# Patient Record
Sex: MALE | Race: WHITE
[De-identification: names, ages, dates, MRNs, and addresses within clinical notes are randomized per-mention and may not be internally consistent; named-entity substitution may affect disease eponyms.]

---

## 2022-08-30 ENCOUNTER — HOSPITAL ENCOUNTER (EMERGENCY)
Dept: HOSPITAL 33 - ED | Age: 27
LOS: 1 days | Discharge: HOME | End: 2022-08-31
Payer: MEDICAID

## 2022-08-30 VITALS — OXYGEN SATURATION: 99 %

## 2022-08-30 VITALS — SYSTOLIC BLOOD PRESSURE: 128 MMHG | DIASTOLIC BLOOD PRESSURE: 90 MMHG | HEART RATE: 64 BPM

## 2022-08-30 DIAGNOSIS — Z79.899: ICD-10-CM

## 2022-08-30 DIAGNOSIS — R10.13: Primary | ICD-10-CM

## 2022-08-30 DIAGNOSIS — Z28.310: ICD-10-CM

## 2022-08-30 LAB
ALBUMIN SERPL-MCNC: 4.6 G/DL (ref 3.5–5)
ALP SERPL-CCNC: 102 U/L (ref 38–126)
ALT SERPL-CCNC: 54 U/L (ref 0–50)
AMYLASE SERPL-CCNC: 79 U/L (ref 30–110)
ANION GAP SERPL CALC-SCNC: 12.9 MEQ/L (ref 5–15)
AST SERPL QL: 50 U/L (ref 17–59)
BASOPHILS # BLD AUTO: 0.05 X10^3/UL (ref 0–0.4)
BILIRUB BLD-MCNC: 0.2 MG/DL (ref 0.2–1.3)
BUN SERPL-MCNC: 12 MG/DL (ref 9–20)
CALCIUM SPEC-MCNC: 9.1 MG/DL (ref 8.4–10.2)
CHLORIDE SERPL-SCNC: 107 MMOL/L (ref 98–107)
CO2 SERPL-SCNC: 27 MMOL/L (ref 22–30)
CREAT SERPL-MCNC: 1.16 MG/DL (ref 0.66–1.25)
EOSINOPHIL # BLD AUTO: 0.16 X10^3/UL (ref 0–0.5)
GFR SERPLBLD BASED ON 1.73 SQ M-ARVRAT: > 60 ML/MIN
GLUCOSE SERPL-MCNC: 107 MG/DL (ref 74–106)
HCT VFR BLD AUTO: 39.9 % (ref 42–50)
HGB BLD-MCNC: 13 G/DL (ref 12.5–18)
LIPASE SERPL-CCNC: 143 U/L (ref 23–300)
LYMPHOCYTES # SPEC AUTO: 1.17 X10^3/UL (ref 1–4.6)
MCH RBC QN AUTO: 30.4 PG (ref 26–32)
MCHC RBC AUTO-ENTMCNC: 32.6 G/DL (ref 32–36)
MONOCYTES # BLD AUTO: 0.66 X10^3/UL (ref 0–1.3)
PLATELET # BLD AUTO: 249 X10^3/UL (ref 150–450)
POTASSIUM SERPLBLD-SCNC: 4 MMOL/L (ref 3.5–5.1)
PROT SERPL-MCNC: 8.2 G/DL (ref 6.3–8.2)
RBC # BLD AUTO: 4.28 X10^6/UL (ref 4.1–5.6)
SODIUM SERPL-SCNC: 143 MMOL/L (ref 137–145)
WBC # BLD AUTO: 6 X10^3/UL (ref 4–10.5)

## 2022-08-30 PROCEDURE — 96374 THER/PROPH/DIAG INJ IV PUSH: CPT

## 2022-08-30 PROCEDURE — 36415 COLL VENOUS BLD VENIPUNCTURE: CPT

## 2022-08-30 PROCEDURE — 82150 ASSAY OF AMYLASE: CPT

## 2022-08-30 PROCEDURE — 83605 ASSAY OF LACTIC ACID: CPT

## 2022-08-30 PROCEDURE — 85025 COMPLETE CBC W/AUTO DIFF WBC: CPT

## 2022-08-30 PROCEDURE — 99283 EMERGENCY DEPT VISIT LOW MDM: CPT

## 2022-08-30 PROCEDURE — 80053 COMPREHEN METABOLIC PANEL: CPT

## 2022-08-30 PROCEDURE — 83690 ASSAY OF LIPASE: CPT

## 2022-08-30 PROCEDURE — 74176 CT ABD & PELVIS W/O CONTRAST: CPT

## 2022-08-30 RX ADMIN — KETOROLAC TROMETHAMINE ONE MG: 30 INJECTION, SOLUTION INTRAMUSCULAR; INTRAVENOUS at 23:41

## 2022-08-30 NOTE — ERPHSYRPT
- History of Present Illness


Historian: patient


Exam Limitations: no limitations


Patient Subjective Stated Complaint: Generalized sharp abdominal pain.


Triage Nursing Assessment: Pt ambulated to room. A&O X 3. Skin color WNL for 

race. Pt c/o generalized sharp abdominal pain across all 4 quadrants. Lung 

sounds clear, bowel sounds present x 4. Abdomen soft and non-distended. Denies 

N/V. Does report some diarrhea 8/28/22 but none today.


Physician History: 





27 yo wm w epigastric pain x 2 days. Pain is 8/10 and stabbing. Nothing makes it

better or worse. He denies 

N/V/D/fever/melena/hematochezia/dysuria/hematuria/cough/coryza.


Timing/Duration: day(s) (2 days)


Quality: sharpness, stabbing


Abdominal Pain Onset Location: epigastric


Pain Radiation: no radiation


Severity of Pain-Max: severe


Severity of Pain-Current: severe


Modifying Factors: Improves With: nothing


Associated Symptoms: No back, No chest pain, No diaphoresis, No diarrhea, No fev

er/chills, No fatigue, No headache, No heartburn, No loss of appetite, No 

nausea, No neck pain, No rash, No shortness of breath, No syncope, No testicular

pain, No vomiting, No weakness


Previous symptoms: no prior history


Allergies/Adverse Reactions: 








No Known Drug Allergies Allergy (Unverified 08/30/22 22:06)


   





Home Medications: 








Clobazam [Onfi] 20 mg PO QAM 08/30/22 [History]


Clobazam [Onfi] 40 mg PO QHS 08/30/22 [History]


Escitalopram Oxalate 5 mg PO QAM 08/30/22 [History]


LORazepam [Lorazepam] 1 mg PO TID 08/30/22 [History]


Lamotrigine 100 mg*** [lamICTAL 100MG TABLET***] 300 mg PO QHS 08/30/22 

[History]


Topiramate 100 mg*** [Topamax 100 MG***] 100 mg PO QAM 08/30/22 [History]


Topiramate 100 mg*** [Topamax 100 MG***] 200 mg PO QHS 08/30/22 [History]


Trazodone HCl 100 mg PO QHS 08/30/22 [History]


lamoTRIgine [Lamotrigine] 150 mg PO QAM 08/30/22 [History]








Travel Risk





- International Travel


Have you traveled outside of the country in past 3 weeks: No





- Coronavirus Screening


Are you exhibiting any of the following symptoms?: No


Close contact with a COVID-19 positive Pt in past 14-21 Days: No





- Vaccine Status


Have you recieved a Covid-19 vaccination: No





- Review of Systems


Constitutional: No Symptoms


Eyes: No Symptoms


Ears, Nose, & Throat: No Symptoms


Respiratory: No Symptoms


Cardiac: No Symptoms


Abdominal/Gastrointestinal: No Symptoms, Abdominal Pain


Genitourinary Symptoms: No Symptoms


Musculoskeletal: No Symptoms


Skin: No Symptoms


Neurological: No Symptoms


Psychological: No Symptoms


Endocrine: No Symptoms


Hematologic/Lymphatic: No Symptoms


Immunological/Allergic: No Symptoms





- Past Medical History


Pertinent Past Medical History: Yes


Neurological History: Epilepsy, Migraines, Seizures


ENT History: No Pertinent History


Cardiac History: No Pertinent History


Respiratory History: No Pertinent History


Endocrine Medical History: No Pertinent History


Musculoskeletal History: No Pertinent History


GI Medical History: No Pertinent History


 History: No Pertinent History


Psycho-Social History: No Pertinent History


Male Reproductive Disorders: No Pertinent History





- Past Surgical History


Past Surgical History: Yes


Neuro Surgical History: Other


Cardiac: No Pertinent History


Respiratory: No Pertinent History


Gastrointestinal: No Pertinent History


Genitourinary: No Pertinent History


Musculoskeletal: No Pertinent History


Male Surgical History: No Pertinent History


Other Surgical History: vagal nerve stimulator





- Social History


Smoking Status: Never smoker


Drug Use: none


Patient Lives Alone: Yes


Significant Family History: no pertinent family hx





- Nursing Vital Signs


Nursing Vital Signs: 


                               Initial Vital Signs











Temperature  97.4 F   08/30/22 22:16


 


Pulse Rate  78   08/30/22 22:16


 


Respiratory Rate  16   08/30/22 22:16


 


Blood Pressure  130/92   08/30/22 22:16


 


O2 Sat by Pulse Oximetry  99   08/30/22 22:16








                                   Pain Scale











Pain Intensity                 6











WNL





- Physical Exam


General Appearance: no apparent distress


Eye Exam: PERRL/EOMI, eyes nml inspection


Ears, Nose, Throat Exam: normal ENT inspection, TMs normal, pharynx normal, 

moist mucous membranes


Neck Exam: normal inspection, non-tender, supple, full range of motion, No 

meningismus, No mass, No Brudzinski, No Kernig's


Respiratory Exam: normal breath sounds, lungs clear, airway intact


Cardiovascular Exam: regular rate/rhythm, normal heart sounds, normal peripheral

 pulses, capillary refill <2 sec, No murmur


Gastrointestinal/Abdomen Exam: soft, normal bowel sounds, tenderness (Mild 

epigastric TTP wo guarding or rebound)


Back Exam: normal inspection, normal range of motion, No CVA tenderness, No 

vertebral tenderness


Extremity Exam: normal inspection, normal range of motion


Neurologic Exam: alert, oriented x 3, cooperative, CNs II-XII nml as tested, 

normal mood/affect, nml cerebellar function, nml station & gait, sensation nml


Skin Exam: normal color, warm, dry, No rash


Lymphatic Exam: No adenopathy


**SpO2 Interpretation**: normal


SpO2: 99


O2 Delivery: Room Air





- Course


Nursing assessment & vital signs reviewed: Yes





- CT Exams


  ** Abdomen/Pelvis


CT Interpretation: Tele-radiologist Report (NAD)


Ordered Tests: 


                               Active Orders 24 hr











 Category Date Time Status


 


 ABDOMEN AND PELVIS W/0 CONTRAS [CT] Stat Exams  08/30/22 23:37 Taken


 


 AMYLASE Stat Lab  08/30/22 23:04 Completed


 


 CBC W DIFF Stat Lab  08/30/22 23:04 Completed


 


 CMP Stat Lab  08/30/22 23:04 Completed


 


 LIPASE Stat Lab  08/30/22 23:04 Completed


 


 Lactic Acid Stat Lab  08/30/22 23:04 Completed








Medication Summary














Discontinued Medications














Generic Name Dose Route Start Last Admin





  Trade Name David  PRN Reason Stop Dose Admin


 


Ketorolac Tromethamine  15 mg  08/30/22 23:37  08/30/22 23:41





  Ketorolac Tromethamine 30 Mg/Ml Inj  IV  08/30/22 23:38  15 mg





  STAT ONE   Administration


 


Ketorolac Tromethamine  Confirm  08/30/22 23:40 





  Ketorolac Tromethamine 30 Mg/Ml Inj  Administered  08/30/22 23:41 





  Dose  





  30 mg  





  .ROUTE  





  .[employer] [facility] ONE  











Lab/Rad Data: 


                           Laboratory Result Diagrams





                                 08/30/22 23:04 





                                 08/30/22 23:04 





                               Laboratory Results











  08/31/22 08/30/22 08/30/22 Range/Units





  00:25 23:04 23:04 


 


WBC    6.0  (4.0-10.5)  x10^3/uL


 


RBC    4.28  (4.1-5.6)  x10^6/uL


 


Hgb    13.0  (12.5-18.0)  g/dL


 


Hct    39.9 L  (42-50)  %


 


MCV    93.2  ()  fL


 


MCH    30.4  (26-32)  pg


 


MCHC    32.6  (32-36)  g/dL


 


RDW    12.6  (11.5-14.0)  %


 


Plt Count    249  (150-450)  x10^3/uL


 


MPV    11.1 H  (7.5-11.0)  fL


 


Gran %    65.7  (36.0-66.0)  %


 


Immature Gran % (Auto)    0.5 H  (0.00-0.4)  %


 


Nucleat RBC Rel Count    0.0  (0.00-0.1)  %


 


Eos # (Auto)    0.16  (0-0.5)  x10^3/uL


 


Immature Gran # (Auto)    0.03  (0.00-0.03)  x10^3u/L


 


Absolute Lymphs (auto)    1.17  (1.0-4.6)  x10^3/uL


 


Absolute Monos (auto)    0.66  (0.0-1.3)  x10^3/uL


 


Absolute Nucleated RBC    0.00  (0.00-0.01)  x10^3u/L


 


Lymphocytes %    19.4 L  (24.0-44.0)  %


 


Monocytes %    10.9  (0.0-12.0)  %


 


Eosinophils %    2.7  (0.00-5.0)  %


 


Basophils %    0.8  (0.0-0.4)  %


 


Absolute Granulocytes    3.96  (1.4-6.9)  x10^3/uL


 


Basophils #    0.05  (0-0.4)  x10^3/uL


 


Sodium   143   (137-145)  mmol/L


 


Potassium   4.0   (3.5-5.1)  mmol/L


 


Chloride   107   ()  mmol/L


 


Carbon Dioxide   27   (22-30)  mmol/L


 


Anion Gap   12.9   (5-15)  MEQ/L


 


BUN   12   (9-20)  mg/dL


 


Creatinine   1.16   (0.66-1.25)  mg/dL


 


Estimated GFR   > 60.0   ML/MIN


 


Glucose   107 H   ()  mg/dL


 


Lactic Acid  0.8    (0.4-2.0)  


 


Calcium   9.1   (8.4-10.2)  mg/dL


 


Total Bilirubin   0.20   (0.2-1.3)  mg/dL


 


AST   50   (17-59)  U/L


 


ALT   54 H   (0-50)  U/L


 


Alkaline Phosphatase   102   ()  U/L


 


Serum Total Protein   8.2   (6.3-8.2)  g/dL


 


Albumin   4.6   (3.5-5.0)  g/dL


 


Amylase   79   ()  U/L


 


Lipase   143   ()  U/L














- Progress


Progress: improved


Progress Note: 





08/31/22 00:43


Pt given 15mg IV Toradol w improvement in pain


Counseled pt/family regarding: lab results, diagnosis, need for follow-up, rad 

results





- Departure


Departure Disposition: Home


Clinical Impression: 


 Abdominal pain





Condition: Stable


Critical Care Time: No


Referrals: 


DOCTOR,NO FAMILY [Primary Care Provider] - Follow up/PCP as directed


Instructions:  Severe Abdominal Pain, Adult (DC)


Additional Instructions: 


Follow up with your family MD in 1-2 days


Return to ER for increasing pain or temperature greater than 100.5

## 2022-08-31 NOTE — XRAY
Indication: Epigastric pain.



Multiple contiguous axial images obtained through the abdomen and pelvis

without contrast.



Comparison: None



Lung bases demonstrates minimal left base subsegmental atelectasis/scarring.

Heart not enlarged.



Stomach is distended with food/fluid.  Noncontrasted stomach and bowel loops

appear nonobstructed with normal appendix.  No free fluid/air.  Gallbladder

contracted without gallstones.  Nonobstructing faint bilateral

nephrocalcinosis.



Remaining liver, gallbladder, pancreas, spleen, adrenal glands, kidneys,

ureters, bladder, and aorta are unremarkable for noncontrast exam.



Osseous structures intact.  No ventral or inguinal hernias.



Impression: Nonobstructing faint bilateral nephrocalcinosis.  Remaining CT

abdomen/pelvis without contrast exam is negative.



Comment: Preliminary interpretation made by [facility].  No critical discrepancy.

## 2023-05-03 ENCOUNTER — HOSPITAL ENCOUNTER (OUTPATIENT)
Dept: HOSPITAL 33 - ED | Age: 28
Setting detail: OBSERVATION
LOS: 1 days | Discharge: HOME | End: 2023-05-04
Attending: INTERNAL MEDICINE | Admitting: INTERNAL MEDICINE
Payer: MEDICAID

## 2023-05-03 DIAGNOSIS — Z79.899: ICD-10-CM

## 2023-05-03 DIAGNOSIS — G40.309: Primary | ICD-10-CM
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
## 2023-05-03 DIAGNOSIS — S01.01XA: ICD-10-CM

## 2023-05-03 DIAGNOSIS — Z20.828: ICD-10-CM

## 2023-05-03 PROCEDURE — 80053 COMPREHEN METABOLIC PANEL: CPT

## 2023-05-03 PROCEDURE — 36415 COLL VENOUS BLD VENIPUNCTURE: CPT

## 2023-05-03 PROCEDURE — 93005 ELECTROCARDIOGRAM TRACING: CPT

## 2023-05-03 PROCEDURE — 99284 EMERGENCY DEPT VISIT MOD MDM: CPT

## 2023-05-03 PROCEDURE — 70450 CT HEAD/BRAIN W/O DYE: CPT

## 2023-05-03 PROCEDURE — 85025 COMPLETE CBC W/AUTO DIFF WBC: CPT

## 2023-05-03 PROCEDURE — 94760 N-INVAS EAR/PLS OXIMETRY 1: CPT

## 2023-05-03 PROCEDURE — G0378 HOSPITAL OBSERVATION PER HR: HCPCS

## 2023-05-03 NOTE — ERPHSYRPT
- History of Present Illness


Source: patient, family


Exam Limitations: no limitations


Patient Subjective Stated Complaint: pt has had multiple seizures today. one at 

1900 at [facility] and one at 2030 at home. pt has laceration on top of scalp, pt 

is here to get it looked at- not necesarily for seizures


Triage Nursing Assessment: pt ambulatory to bed by self, pt alert and oriented 

x3, pt has had multiple seizures today, pt on multiple meds for seizures,  at 

1930 pt had seizure at [facility] and hit top of head on [facility] shelf, ambulance 

was called but patient did not want to come to hospital at the time, pt had a 

2nd seizure at 2030 at home and busted open that same laceration on the top of 

his head, laceration is 2 cm x 0.5 cm, laceration still slightly bleeding,


Physician History: 





Patient is a 27-year-old male with past medical history of epilepsy presents to 

the ER after having multiple seizures today and a head injury.  Patient reports 

he was at [facility] and had a seizure that resulted in him having loss of 

consciousness and hitting his head on a metal shelf.  Patient reports having a 

small laceration with mild bleeding from it.  Patient reports EMS was called but

at the time patient was not reluctant to go to the ER.  Patient was taken back 

home.  Patient reports he was taking a shower and had a second seizure.  Patient

is alert and oriented x4.  Denies having any focal weaknesses.  Patient reports 

he has had multiple seizures in the past week. Patient reports he has not 

recently seen a neurologist.  Patient is taking his medications as prescribed.  

Denies having any headaches, blurry vision, loss lightheadedness, chest pain, 

shortness of breath, nausea/vomiting.


Allergies/Adverse Reactions: 








No Known Drug Allergies Allergy (Verified 05/03/23 22:44)


   





Home Medications: 








Clobazam [Onfi] 20 mg PO QAM 08/30/22 [History]


Clobazam [Onfi] 40 mg PO QHS 08/30/22 [History]


Escitalopram Oxalate 5 mg PO QAM 08/30/22 [History]


LORazepam [Lorazepam] 1 mg PO TID 08/30/22 [History]


Lamotrigine 100 mg*** [lamICTAL 100MG TABLET***] 300 mg PO QHS 08/30/22 

[History]


Topiramate 100 mg*** [Topamax 100 MG***] 100 mg PO QAM 08/30/22 [History]


Topiramate 100 mg*** [Topamax 100 MG***] 200 mg PO QHS 08/30/22 [History]


Trazodone HCl 100 mg PO QHS 08/30/22 [History]


lamoTRIgine [Lamotrigine] 150 mg PO QAM 08/30/22 [History]





Hx Tetanus, Diphtheria Vaccination/Date Given: No


Hx Influenza Vaccination/Date Given: No


Hx Pneumococcal Vaccination/Date Given: No


Immunizations Up to Date: Yes





Travel Risk





- International Travel


Have you traveled outside of the country in past 3 weeks: No





- Coronavirus Screening


Are you exhibiting any of the following symptoms?: No


Close contact with a COVID-19 positive Pt in past 14-21 Days: No





- Vaccine Status


Have you recieved a Covid-19 vaccination: No





- Review of Systems


Constitutional: No Fever, No Chills


Eyes: No Symptoms


Ears, Nose, & Throat: No Symptoms


Respiratory: No Cough, No Dyspnea


Cardiac: No Chest Pain, No Edema, No Syncope


Abdominal/Gastrointestinal: No Abdominal Pain, No Nausea, No Vomiting, No 

Diarrhea


Genitourinary Symptoms: No Dysuria


Musculoskeletal: Fall (He reports head trauma.), No Back Pain, No Neck Pain


Skin: No Rash


Neurological: Seizure, No Dizziness, No Focal Weakness, No Sensory Changes


Psychological: No Symptoms


Endocrine: No Symptoms


All Other Systems: Reviewed and Negative





- Past Medical History


Pertinent Past Medical History: Yes


Neurological History: Epilepsy, Migraines, Seizures


ENT History: No Pertinent History


Cardiac History: No Pertinent History


Respiratory History: No Pertinent History


Endocrine Medical History: No Pertinent History


Musculoskeletal History: No Pertinent History


GI Medical History: No Pertinent History


 History: No Pertinent History


Psycho-Social History: No Pertinent History


Male Reproductive Disorders: No Pertinent History





- Past Surgical History


Past Surgical History: Yes


Neuro Surgical History: Other


Cardiac: No Pertinent History


Respiratory: No Pertinent History


Gastrointestinal: No Pertinent History


Genitourinary: No Pertinent History


Musculoskeletal: No Pertinent History


Male Surgical History: No Pertinent History


Other Surgical History: vagal nerve stimulator





- Social History


Smoking Status: Never smoker


Exposure to second hand smoke: No


Drug Use: none


Patient Lives Alone: Yes


Significant Family History: no pertinent family hx





- Nursing Vital Signs


Nursing Vital Signs: 


                               Initial Vital Signs











Temperature  98.7 F   05/03/23 22:46


 


Pulse Rate  84   05/03/23 22:46


 


Respiratory Rate  18   05/03/23 22:46


 


Blood Pressure  101/67   05/03/23 22:46


 


O2 Sat by Pulse Oximetry  98   05/03/23 22:46








                                   Pain Scale











Pain Intensity                 3

















- Physical Exam


General Appearance: no apparent distress, alert


Eye Exam: PERRL/EOMI, eyes nml inspection


Ears, Nose, Throat Exam: normal ENT inspection, pharynx normal, moist mucous 

membranes


Neck Exam: normal inspection, non-tender, supple, full range of motion


Respiratory Exam: normal breath sounds, lungs clear, No respiratory distress


Cardiovascular Exam: regular rate/rhythm, normal heart sounds, normal peripheral

 pulses


Gastrointestinal/Abdomen Exam: soft, normal bowel sounds, No tenderness, No mass


Back Exam: normal inspection, normal range of motion, No CVA tenderness, No 

vertebral tenderness


Extremity Exam: normal inspection, normal range of motion, pelvis stable


Neurologic Exam: alert, oriented x 3, cooperative, CNs II-XII nml as tested 

(Normal strength and sensation of upper/lower extremities.), normal mood/affect,

 nml cerebellar function, nml station & gait, sensation nml, No motor deficits


Skin Exam: normal color, warm, dry, No rash


Lymphatic Exam: No adenopathy


**SpO2 Interpretation**: normal


SpO2: 98


O2 Delivery: Room Air


Comments: 





05/04/23 00:00


Head exam noted to have a laceration at the top of the head at about 2 cm x 1 mm

 along mild bleeding.


Ordered Tests: 


                               Active Orders 24 hr











 Category Date Time Status


 


 EKG-ER Only STAT Care  05/03/23 23:45 Active


 


 Pulse Oximetry (ED) STAT Care  05/03/23 23:45 Active


 


 Seizure Precautions -SCCHED STAT Care  05/03/23 23:45 Active


 


 HEAD WITHOUT CONTRAST [CT] Stat Exams  05/03/23 23:46 Completed











Lab/Rad Data: 


                           Laboratory Result Diagrams





                                 05/03/23 00:09 





                                 05/03/23 00:09 





                               Laboratory Results











  05/03/23 05/03/23 Range/Units





  00:09 00:09 


 


WBC   6.2  (4.0-10.5)  x10^3/uL


 


RBC   4.14  (4.1-5.6)  x10^6/uL


 


Hgb   12.6  (12.5-18.0)  g/dL


 


Hct   38.8 L  (42-50)  %


 


MCV   93.7  ()  fL


 


MCH   30.4  (26-32)  pg


 


MCHC   32.5  (32-36)  g/dL


 


RDW   13.6  (11.5-14.0)  %


 


Plt Count   195  (150-450)  x10^3/uL


 


MPV   10.8  (7.5-11.0)  fL


 


Gran %   68.7 H  (36.0-66.0)  %


 


Immature Gran % (Auto)   0.3  (0.00-0.4)  %


 


Nucleat RBC Rel Count   0.0  (0.00-0.1)  %


 


Eos # (Auto)   0.16  (0-0.5)  x10^3/uL


 


Immature Gran # (Auto)   0.02  (0.00-0.03)  x10^3u/L


 


Absolute Lymphs (auto)   1.19  (1.0-4.6)  x10^3/uL


 


Absolute Monos (auto)   0.51  (0.0-1.3)  x10^3/uL


 


Absolute Nucleated RBC   0.00  (0.00-0.01)  x10^3u/L


 


Lymphocytes %   19.3 L  (24.0-44.0)  %


 


Monocytes %   8.3  (0.0-12.0)  %


 


Eosinophils %   2.6  (0.00-5.0)  %


 


Basophils %   0.8  (0.0-0.4)  %


 


Absolute Granulocytes   4.25  (1.4-6.9)  x10^3/uL


 


Basophils #   0.05  (0-0.4)  x10^3/uL


 


Sodium  142   (137-145)  mmol/L


 


Potassium  4.1   (3.5-5.1)  mmol/L


 


Chloride  107   ()  mmol/L


 


Carbon Dioxide  23   (22-30)  mmol/L


 


Anion Gap  15.7 H   (5-15)  MEQ/L


 


BUN  15   (9-20)  mg/dL


 


Creatinine  1.45 H   (0.66-1.25)  mg/dL


 


Estimated GFR  > 60.0   ML/MIN


 


Glucose  97   ()  mg/dL


 


Calcium  8.4   (8.4-10.2)  mg/dL


 


Total Bilirubin  0.30   (0.2-1.3)  mg/dL


 


AST  26   (17-59)  U/L


 


ALT  32   (0-50)  U/L


 


Alkaline Phosphatase  102   ()  U/L


 


Serum Total Protein  7.4   (6.3-8.2)  g/dL


 


Albumin  4.0   (3.5-5.0)  g/dL














- Progress


Progress: unchanged


Progress Note: 





05/04/23 03:54


Patient reports feeling better. Pt denies having any changes in mentation. 

Patient has been A&O x3 since arrival to ED. Pt has significant hx of epilepsy 

with no recent follow up with neurology. Spoke to Dr. Camargo regarding patient 

case, accept patient for observation. 


Discussed with : Kiara


Will see patient in: hospital (observation)





- Departure


Departure Disposition: Observation


Clinical Impression: 


 Seizures, Epilepsy





Condition: Stable


Critical Care Time: No


Referrals: 


REN RIGGS MD [Primary Care Provider] - Follow up/PCP as directed

## 2023-05-04 VITALS — SYSTOLIC BLOOD PRESSURE: 105 MMHG | DIASTOLIC BLOOD PRESSURE: 57 MMHG | HEART RATE: 72 BPM | OXYGEN SATURATION: 95 %

## 2023-05-04 LAB
ALBUMIN SERPL-MCNC: 4 G/DL (ref 3.5–5)
ALP SERPL-CCNC: 102 U/L (ref 38–126)
ALT SERPL-CCNC: 32 U/L (ref 0–50)
ANION GAP SERPL CALC-SCNC: 15.7 MEQ/L (ref 5–15)
AST SERPL QL: 26 U/L (ref 17–59)
BASOPHILS # BLD AUTO: 0.05 X10^3/UL (ref 0–0.4)
BASOPHILS NFR BLD AUTO: 0.8 % (ref 0–0.4)
BILIRUB BLD-MCNC: 0.3 MG/DL (ref 0.2–1.3)
BUN SERPL-MCNC: 15 MG/DL (ref 9–20)
CALCIUM SPEC-MCNC: 8.4 MG/DL (ref 8.4–10.2)
CHLORIDE SERPL-SCNC: 107 MMOL/L (ref 98–107)
CO2 SERPL-SCNC: 23 MMOL/L (ref 22–30)
CREAT SERPL-MCNC: 1.45 MG/DL (ref 0.66–1.25)
EOSINOPHIL # BLD AUTO: 0.16 X10^3/UL (ref 0–0.5)
GFR SERPLBLD BASED ON 1.73 SQ M-ARVRAT: > 60 ML/MIN
GLUCOSE SERPL-MCNC: 97 MG/DL (ref 74–106)
HCT VFR BLD AUTO: 38.8 % (ref 42–50)
HGB BLD-MCNC: 12.6 G/DL (ref 12.5–18)
IMM GRANULOCYTES # BLD: 0.02 X10^3U/L (ref 0–0.03)
IMM GRANULOCYTES NFR BLD: 0.3 % (ref 0–0.4)
LYMPHOCYTES # SPEC AUTO: 1.19 X10^3/UL (ref 1–4.6)
MCH RBC QN AUTO: 30.4 PG (ref 26–32)
MCHC RBC AUTO-ENTMCNC: 32.5 G/DL (ref 32–36)
MONOCYTES # BLD AUTO: 0.51 X10^3/UL (ref 0–1.3)
NRBC # BLD AUTO: 0 X10^3U/L (ref 0–0.01)
NRBC BLD AUTO-RTO: 0 % (ref 0–0.1)
PLATELET # BLD AUTO: 195 X10^3/UL (ref 150–450)
POTASSIUM SERPLBLD-SCNC: 4.1 MMOL/L (ref 3.5–5.1)
PROT SERPL-MCNC: 7.4 G/DL (ref 6.3–8.2)
RBC # BLD AUTO: 4.14 X10^6/UL (ref 4.1–5.6)
SODIUM SERPL-SCNC: 142 MMOL/L (ref 137–145)
WBC # BLD AUTO: 6.2 X10^3/UL (ref 4–10.5)

## 2023-05-04 NOTE — PCM.SSS
History of Present Illness





- Chief Complaint


Chief Complaint: Seizures x2


History of Present Illness: 


 is a 27 year old male.with past medical history of epilepsy presents to

the ER after having multiple seizures today and a head injury.  Patient reports 

he was at [facility] and had a seizure that resulted in him having loss of 

consciousness and hitting his head on a metal shelf.  Patient reports having a 

small laceration with mild bleeding from it.  Patient reports EMS was called but

at the time patient was not reluctant to go to the ER.  Patient was taken back 

home.  Patient reports he was taking a shower and had a second seizure.  Patient

is alert and oriented x4.  Denies having any focal weaknesses.  Patient reports 

he has had multiple seizures in the past week. Patient reports he has not 

recently seen a neurologist.  Patient is taking his medications as prescribed.  

Denies having any headaches, blurry vision, loss lightheadedness, chest pain, 

shortness of breath, nausea/vomiting.








- Review of Systems


Constitutional: No Fever, No Chills


Eyes: No Symptoms


Ears, Nose, & Throat: No Symptoms


Respiratory: No Cough, No Short Of Breath


Cardiac: No Chest Pain, No Edema, No Syncope


Abdominal/Gastrointestinal: No Abdominal Pain, No Nausea, No Vomiting, No 

Diarrhea


Genitourinary Symptoms: No Dysuria


Musculoskeletal: No Back Pain, No Neck Pain


Skin: No Rash


Neurological: Seizure, No Dizziness, No Focal Weakness, No Sensory Changes


Psychological: No Symptoms


Endocrine: No Symptoms


Hematologic/Lymphatic: No Symptoms


Immunological/Allergic: No Symptoms





Medications & Allergies


Home Medications: 


                              Home Medication List





Clobazam [Onfi] 20 mg PO QAM 08/30/22 [History Confirmed 05/03/23]


Clobazam [Onfi] 40 mg PO QHS 08/30/22 [History Confirmed 05/03/23]


Escitalopram Oxalate 5 mg PO QAM 08/30/22 [History Confirmed 05/03/23]


LORazepam [Lorazepam] 1 mg PO TID 08/30/22 [History Confirmed 05/03/23]


Lamotrigine 100 mg*** [lamICTAL 100MG TABLET***] 300 mg PO QHS 08/30/22 [History

 Confirmed 05/03/23]


Topiramate 100 mg*** [Topamax 100 MG***] 100 mg PO QAM 08/30/22 [History C

onfirmed 05/03/23]


Topiramate 100 mg*** [Topamax 100 MG***] 200 mg PO QHS 08/30/22 [History 

Confirmed 05/03/23]


Trazodone HCl 100 mg PO QHS 08/30/22 [History Confirmed 05/03/23]


lamoTRIgine [Lamotrigine] 150 mg PO QAM 08/30/22 [History Confirmed 05/03/23]


Levetiracetam [Keppra] 750 mg PO BID 05/04/23 [History Confirmed 05/04/23]








Allergies/Adverse Reactions: 


                                    Allergies











Allergy/AdvReac Type Severity Reaction Status Date / Time


 


No Known Drug Allergies Allergy   Verified 05/03/23 22:44














- Past Medical History


Past Medical History: Yes


Neurological History: Seizures


ENT History: No Pertinent History, Cataracts


Cardiac History: No Pertinent History


Respiratory History: No Pertinent History


Endocrine Medical History: No Pertinent History


Musculoskelatal History: No Pertinent History


GI Medical History: No Pertinent History


 History: No Pertinent History


Pyscho-Social History: No Pertinent History


Male Reproductive Disorders: No Pertinent History


Comment: VVagus nerve Stimulator device implanted





- Past Surgical History


Past Surgical History: Yes


Neuro Surgical History: Other


Cardiac History: No Pertinent History


Respiratory Surgery: No Pertinent History


GI Surgical History: No Pertinent History


Genitourinary Surgical Hx: No Pertinent History


Musculskeletal Surgical Hx: No Pertinent History


Male Surgical History: No Pertinent History


Other Surgical History: vagal nerve stimulator





- Social History


Smoking Status: Never smoker


Exposure to second hand smoke: Yes


Alcohol: None


Drug Use: none


Significant Family History: no pertinent family hx





- Physical Exam


Vital Signs: 


                               Vital Signs - 24 hr











  Temp Pulse Resp BP BP Pulse Ox


 


 05/04/23 11:32  97.7 F  72  17   105/57  95


 


 05/04/23 07:20  97.8 F  69  17   94/81  92 L


 


 05/04/23 05:00  98.7 F  58 L  18   114/75  99


 


 05/04/23 04:00   69  16  96/62   97


 


 05/04/23 03:57       98


 


 05/04/23 03:00   62  17  109/73   95


 


 05/04/23 02:00   61  18  80/56   95


 


 05/04/23 01:00   70  18  98/66   93 L


 


 05/04/23 00:04       97


 


 05/04/23 00:00    20  92/65   96


 


 05/03/23 23:00    16  108/73   98


 


 05/03/23 22:46  98.7 F  84  18   101/67  98











General Appearance: no apparent distress, alert


Neurologic Exam: alert, oriented x 3, cooperative, normal mood/affect, nml 

cerebellar function, nml station & gait, sensation nml, No motor deficits


Eye Exam: PERRL/EOMI, eyes nml inspection


Ears, Nose, Throat Exam: normal ENT inspection, TMs normal, pharynx normal, 

moist mucous membranes


Neck Exam: normal inspection, non-tender, supple, full range of motion


Respiratory Exam: normal breath sounds, lungs clear, No respiratory distress


Cardiovascular Exam: regular rate/rhythm, normal heart sounds, normal peripheral

pulses


Gastrointestinal/Abdomen Exam: soft, normal bowel sounds, No tenderness, No mass


Back Exam: normal inspection, normal range of motion, No CVA tenderness, No 

vertebral tenderness


Extremity Exam: normal inspection, normal range of motion, pelvis stable


Skin Exam: normal color, warm, dry, No rash


Lymphatic Exam: No adenopathy





Results





- Labs


Lab/Micro Results: 


                            Lab Results-Last 24 Hours











  05/03/23 05/03/23 Range/Units





  00:09 00:09 


 


WBC  6.2   (4.0-10.5)  x10^3/uL


 


RBC  4.14   (4.1-5.6)  x10^6/uL


 


Hgb  12.6   (12.5-18.0)  g/dL


 


Hct  38.8 L   (42-50)  %


 


MCV  93.7   ()  fL


 


MCH  30.4   (26-32)  pg


 


MCHC  32.5   (32-36)  g/dL


 


RDW  13.6   (11.5-14.0)  %


 


Plt Count  195   (150-450)  x10^3/uL


 


MPV  10.8   (7.5-11.0)  fL


 


Gran %  68.7 H   (36.0-66.0)  %


 


Immature Gran % (Auto)  0.3   (0.00-0.4)  %


 


Nucleat RBC Rel Count  0.0   (0.00-0.1)  %


 


Eos # (Auto)  0.16   (0-0.5)  x10^3/uL


 


Immature Gran # (Auto)  0.02   (0.00-0.03)  x10^3u/L


 


Absolute Lymphs (auto)  1.19   (1.0-4.6)  x10^3/uL


 


Absolute Monos (auto)  0.51   (0.0-1.3)  x10^3/uL


 


Absolute Nucleated RBC  0.00   (0.00-0.01)  x10^3u/L


 


Lymphocytes %  19.3 L   (24.0-44.0)  %


 


Monocytes %  8.3   (0.0-12.0)  %


 


Eosinophils %  2.6   (0.00-5.0)  %


 


Basophils %  0.8   (0.0-0.4)  %


 


Absolute Granulocytes  4.25   (1.4-6.9)  x10^3/uL


 


Basophils #  0.05   (0-0.4)  x10^3/uL


 


Sodium   142  (137-145)  mmol/L


 


Potassium   4.1  (3.5-5.1)  mmol/L


 


Chloride   107  ()  mmol/L


 


Carbon Dioxide   23  (22-30)  mmol/L


 


Anion Gap   15.7 H  (5-15)  MEQ/L


 


BUN   15  (9-20)  mg/dL


 


Creatinine   1.45 H  (0.66-1.25)  mg/dL


 


Estimated GFR   > 60.0  ML/MIN


 


Glucose   97  ()  mg/dL


 


Calcium   8.4  (8.4-10.2)  mg/dL


 


Total Bilirubin   0.30  (0.2-1.3)  mg/dL


 


AST   26  (17-59)  U/L


 


ALT   32  (0-50)  U/L


 


Alkaline Phosphatase   102  ()  U/L


 


Serum Total Protein   7.4  (6.3-8.2)  g/dL


 


Albumin   4.0  (3.5-5.0)  g/dL














- Radiology Impressions


Radiology Exams & Impressions: 


                              Radiology Procedures











 Category Date Time Status


 


 HEAD WITHOUT CONTRAST [CT] Stat Exams  05/03/23 23:46 Completed














Assessment/Plan


(1) Epilepsy


Status: Acute   


Qualifiers: 


   Epilepsy type: generalized idiopathic   Intractability: not intractable   

Status epilepticus: without status epilepticus   Qualified Code(s): G40.309 - 

Generalized idiopathic epilepsy and epileptic syndromes, not intractable, 

without status epilepticus   


Assessment & Plan: 


                                 Chief Complaint





Diagnosis                        Seizures





                                    Allergies











Allergy/AdvReac Type Severity Reaction Status Date / Time


 


No Known Drug Allergies Allergy   Verified 05/03/23 22:44








                           Vital Signs (Last 24 hours)











  Temp Pulse Resp BP BP Pulse Ox


 


 05/04/23 11:32  97.7 F  72  17   105/57  95


 


 05/04/23 07:20  97.8 F  69  17   94/81  92 L


 


 05/04/23 05:00  98.7 F  58 L  18   114/75  99


 


 05/04/23 04:00   69  16  96/62   97


 


 05/04/23 03:57       98


 


 05/04/23 03:00   62  17  109/73   95


 


 05/04/23 02:00   61  18  80/56   95


 


 05/04/23 01:00   70  18  98/66   93 L


 


 05/04/23 00:04       97


 


 05/04/23 00:00    20  92/65   96


 


 05/03/23 23:00    16  108/73   98


 


 05/03/23 22:46  98.7 F  84  18   101/67  98








                                Home Medications











 Medication  Instructions  Recorded  Confirmed  Last Taken  Type


 


Levetiracetam [Keppra] 750 mg PO BID 05/04/23 05/04/23 05/03/23 History





    750 mg 








                               Current Medications














Discontinued Medications














Generic Name Dose Route Start Last Admin





  Trade Name Freq  PRN Reason Stop Dose Admin


 


Acetaminophen  650 mg  05/04/23 04:32  05/04/23 05:28





  Acetaminophen 325 Mg Tablet  PO  06/03/23 04:31  650 mg





  Q4H PRN PRN   Administration





  PAIN AND/OR FEVER  


 


Escitalopram Oxalate  5 mg  05/04/23 11:30  05/04/23 12:40





  Escitalopram Oxalate** 10 Mg Tablet  PO  06/03/23 11:29  Not Given





  QAM GILDA  


 


Ketorolac Tromethamine  30 mg  05/04/23 04:32 





  Ketorolac Tromethamine 30 Mg/Ml Inj  IV  05/09/23 04:31 





  Q6H PRN PRN  





  PAIN  


 


Lamotrigine  150 mg  05/04/23 11:30  05/04/23 11:23





  Lamotrigine 100 Mg Tab  PO  06/03/23 11:29  150 mg





  QAM GILDA   Administration


 


Lamotrigine  300 mg  05/04/23 22:00 





  Lamotrigine 100 Mg Tab  PO  06/03/23 21:59 





  QHS GILDA  


 


Levetiracetam  250 mg  05/04/23 11:30  05/04/23 11:22





  Levetiracetam 250 Mg Tablet  PO  06/03/23 11:29  250 mg





  BID GILDA   Administration


 


Levetiracetam  500 mg  05/04/23 11:30  05/04/23 11:28





  Levetiracetam 500 Mg Tablet  PO  06/03/23 11:29  500 mg





  BID GILDA   Administration


 


Lorazepam  1 mg  05/04/23 15:00  05/04/23 15:04





  Lorazepam 1 Mg Tablet  PO  06/03/23 14:59  Not Given





  TID GILDA  


 


Miscellaneous Information  1 each  05/04/23 11:30 





  Medication Intervention 1 Each Each    06/03/23 11:29 





  .RN TO CHECK GILDA  


 


Topiramate  100 mg  05/04/23 11:30  05/04/23 11:23





  Topiramate 50 Mg Tablet  PO  06/03/23 11:29  100 mg





  QAM GILDA   Administration


 


Topiramate  200 mg  05/04/23 22:00 





  Topiramate 50 Mg Tablet  PO  06/03/23 21:59 





  QHS GILDA  


 


Trazodone HCl  100 mg  05/04/23 22:00 





  Trazodone Hcl 50 Mg Tablet  PO  06/03/23 21:59 





  QHS GILDA  








                         Intake & Output (Last 24 hours)











 05/02/23 05/03/23 05/04/23 05/05/23





 11:59 11:59 11:59 11:59


 


Intake Total   60 120


 


Balance   60 120


 


Weight   88.6 kg 








                       Laboratory Results (Last 24 hours)











  05/03/23 05/03/23





  00:09 00:09


 


WBC   6.2


 


RBC   4.14


 


Hgb   12.6


 


Hct   38.8 L


 


MCV   93.7


 


MCH   30.4


 


MCHC   32.5


 


RDW   13.6


 


Plt Count   195


 


MPV   10.8


 


Gran %   68.7 H


 


Immature Gran % (Auto)   0.3


 


Nucleat RBC Rel Count   0.0


 


Eos # (Auto)   0.16


 


Immature Gran # (Auto)   0.02


 


Absolute Lymphs (auto)   1.19


 


Absolute Monos (auto)   0.51


 


Absolute Nucleated RBC   0.00


 


Lymphocytes %   19.3 L


 


Monocytes %   8.3


 


Eosinophils %   2.6


 


Basophils %   0.8


 


Absolute Granulocytes   4.25


 


Basophils #   0.05


 


Sodium  142 


 


Potassium  4.1 


 


Chloride  107 


 


Carbon Dioxide  23 


 


Anion Gap  15.7 H 


 


BUN  15 


 


Creatinine  1.45 H 


 


Estimated GFR  > 60.0 


 


Glucose  97 


 


Calcium  8.4 


 


Total Bilirubin  0.30 


 


AST  26 


 


ALT  32 


 


Alkaline Phosphatase  102 


 


Serum Total Protein  7.4 


 


Albumin  4.0 








                             Orders (Last 24 hours)











 Category Date Time Status


 


 Up With Assistance AS TOLERATED Activity  05/04/23 04:32 Completed


 


 Call Admit Doctor for Orders ON ADMISSION Care  05/04/23 04:32 Completed


 


 Code Status Order ROUTINE Care  05/04/23 04:32 Completed


 


 EKG-ER Only STAT Care  05/03/23 23:45 Completed


 


 IV Care Q6H Care  05/04/23 04:32 Completed


 


 Place in Observation ROUTINE Care  05/04/23 04:32 Completed


 


 Pulse Oximetry (ED) STAT Care  05/03/23 23:45 Completed


 


 Seizure Precautions -SCCHED STAT Care  05/03/23 23:45 Completed


 


 House Regular Diet Diet  05/04/23 Breakfast Completed


 


 Discharge Routine Discharge  05/04/23 12:04 Ordered


 


 HEAD WITHOUT CONTRAST [CT] Stat Exams  05/03/23 23:46 Completed


 


 Acetaminophen 325 mg*** [Tylenol 325 mg***] Med  05/04/23 04:32 Discontinued





 650 mg PO Q4H PRN PRN   


 


 Escitalopram Oxalate** [Lexapro**] Med  05/04/23 11:30 Discontinued





 5 mg PO QAM   


 


 KETOROLAC trometh 30 mg Inj*** [TORAdol 30 mg Injection Med  05/04/23 04:32 

Discontinued





 ***]   





 30 mg IV Q6H PRN PRN   


 


 Lamotrigine 100 mg*** [lamICTAL 100MG TABLET***] Med  05/04/23 11:30 

Discontinued





 150 mg PO QAM   


 


 Lamotrigine 100 mg*** [lamICTAL 100MG TABLET***] Med  05/04/23 22:00 

Discontinued





 300 mg PO QHS   


 


 Levetiracetam 250 MG*** [Keppra 250 MG***] Med  05/04/23 11:30 Discontinued





 250 mg PO BID   


 


 Levetiracetam [Keppra] Med  05/04/23 11:30 Discontinued





 500 mg PO BID   


 


 Lorazepam 1 mg*** [Ativan 1 MG***] Med  05/04/23 15:00 Discontinued





 1 mg PO TID   


 


 Medication Intervention Med  05/04/23 11:30 Discontinued





 1 each MC .RN TO CHECK   


 


 Topiramate Med  05/04/23 11:30 Discontinued





 100 mg PO QAM   


 


 Topiramate Med  05/04/23 22:00 Discontinued





 200 mg PO QHS   


 


 Trazodone HCl 50 mg*** [Desyrel 50 mg***] Med  05/04/23 22:00 Discontinued





 100 mg PO QHS   


 


 OT Screen per Nursing Assess ONCE OT  05/04/23 05:27 Completed


 


 PT Screen per Nursing Assess ONCE PT  05/04/23 05:27 Completed


 


 Transfer Order Routine Transfer  05/04/23 Completed


 


 Transfer Order Routine Transfer  05/04/23 Completed








                       Patient Care Notes (Last 24 hours)





05/04/23 15:44 Nursing Note by Carline Jay


PT and pt brother Ethan/ caregiver requested this nurse fax paperwork to pt [facility] 

nurse. This nurse contacted [facility] nurse Yuko Pérez at [phone] and obtained fax 

number to send documents. Explanation of care and results were disscussed with 

Yuko JURADO, with patient and patient caregiver permission. Yuko JURADO will try to arrange 

transportation to neurology appointment on May 10 at 1200, due to pt family 

stating they most likely could not keep this appointment. 





Initialized on 05/04/23 15:44 - END OF NOTE














Code(s): G40.909 - EPILEPSY, UNSP, NOT INTRACTABLE, WITHOUT STATUS EPILEPTICUS  







(2) Laceration of scalp


Status: Acute   





Hospital Summary





- Hospital Course


Hospital Course: 





                                 Chief Complaint





Diagnosis                        Seizures





                                    Allergies











Allergy/AdvReac Type Severity Reaction Status Date / Time


 


No Known Drug Allergies Allergy   Verified 05/03/23 22:44








                           Vital Signs (Last 24 hours)











  Temp Pulse Resp BP BP Pulse Ox


 


 05/04/23 11:32  97.7 F  72  17   105/57  95


 


 05/04/23 07:20  97.8 F  69  17   94/81  92 L


 


 05/04/23 05:00  98.7 F  58 L  18   114/75  99


 


 05/04/23 04:00   69  16  96/62   97


 


 05/04/23 03:57       98


 


 05/04/23 03:00   62  17  109/73   95


 


 05/04/23 02:00   61  18  80/56   95


 


 05/04/23 01:00   70  18  98/66   93 L


 


 05/04/23 00:04       97


 


 05/04/23 00:00    20  92/65   96


 


 05/03/23 23:00    16  108/73   98


 


 05/03/23 22:46  98.7 F  84  18   101/67  98








                                Home Medications











 Medication  Instructions  Recorded  Confirmed  Last Taken  Type


 


Levetiracetam [Keppra] 750 mg PO BID 05/04/23 05/04/23 05/03/23 History





    750 mg 








                               Current Medications














Discontinued Medications














Generic Name Dose Route Start Last Admin





  Trade Name Freq  PRN Reason Stop Dose Admin


 


Acetaminophen  650 mg  05/04/23 04:32  05/04/23 05:28





  Acetaminophen 325 Mg Tablet  PO  06/03/23 04:31  650 mg





  Q4H PRN PRN   Administration





  PAIN AND/OR FEVER  


 


Escitalopram Oxalate  5 mg  05/04/23 11:30  05/04/23 12:40





  Escitalopram Oxalate** 10 Mg Tablet  PO  06/03/23 11:29  Not Given





  QAM GILDA  


 


Ketorolac Tromethamine  30 mg  05/04/23 04:32 





  Ketorolac Tromethamine 30 Mg/Ml Inj  IV  05/09/23 04:31 





  Q6H PRN PRN  





  PAIN  


 


Lamotrigine  150 mg  05/04/23 11:30  05/04/23 11:23





  Lamotrigine 100 Mg Tab  PO  06/03/23 11:29  150 mg





  QAM GILDA   Administration


 


Lamotrigine  300 mg  05/04/23 22:00 





  Lamotrigine 100 Mg Tab  PO  06/03/23 21:59 





  QHS GILDA  


 


Levetiracetam  250 mg  05/04/23 11:30  05/04/23 11:22





  Levetiracetam 250 Mg Tablet  PO  06/03/23 11:29  250 mg





  BID GILDA   Administration


 


Levetiracetam  500 mg  05/04/23 11:30  05/04/23 11:28





  Levetiracetam 500 Mg Tablet  PO  06/03/23 11:29  500 mg





  BID GILDA   Administration


 


Lorazepam  1 mg  05/04/23 15:00  05/04/23 15:04





  Lorazepam 1 Mg Tablet  PO  06/03/23 14:59  Not Given





  TID GILDA  


 


Miscellaneous Information  1 each  05/04/23 11:30 





  Medication Intervention 1 Each Each    06/03/23 11:29 





  .RN TO CHECK GILDA  


 


Topiramate  100 mg  05/04/23 11:30  05/04/23 11:23





  Topiramate 50 Mg Tablet  PO  06/03/23 11:29  100 mg





  QAM GILDA   Administration


 


Topiramate  200 mg  05/04/23 22:00 





  Topiramate 50 Mg Tablet  PO  06/03/23 21:59 





  QHS GILDA  


 


Trazodone HCl  100 mg  05/04/23 22:00 





  Trazodone Hcl 50 Mg Tablet  PO  06/03/23 21:59 





  [facility]  








                         Intake & Output (Last 24 hours)











 05/02/23 05/03/23 05/04/23 05/05/23





 11:59 11:59 11:59 11:59


 


Intake Total   60 120


 


Balance   60 120


 


Weight   88.6 kg 








                       Laboratory Results (Last 24 hours)











  05/03/23 05/03/23





  00:09 00:09


 


WBC   6.2


 


RBC   4.14


 


Hgb   12.6


 


Hct   38.8 L


 


MCV   93.7


 


MCH   30.4


 


MCHC   32.5


 


RDW   13.6


 


Plt Count   195


 


MPV   10.8


 


Gran %   68.7 H


 


Immature Gran % (Auto)   0.3


 


Nucleat RBC Rel Count   0.0


 


Eos # (Auto)   0.16


 


Immature Gran # (Auto)   0.02


 


Absolute Lymphs (auto)   1.19


 


Absolute Monos (auto)   0.51


 


Absolute Nucleated RBC   0.00


 


Lymphocytes %   19.3 L


 


Monocytes %   8.3


 


Eosinophils %   2.6


 


Basophils %   0.8


 


Absolute Granulocytes   4.25


 


Basophils #   0.05


 


Sodium  142 


 


Potassium  4.1 


 


Chloride  107 


 


Carbon Dioxide  23 


 


Anion Gap  15.7 H 


 


BUN  15 


 


Creatinine  1.45 H 


 


Estimated GFR  > 60.0 


 


Glucose  97 


 


Calcium  8.4 


 


Total Bilirubin  0.30 


 


AST  26 


 


ALT  32 


 


Alkaline Phosphatase  102 


 


Serum Total Protein  7.4 


 


Albumin  4.0 








                             Orders (Last 24 hours)











 Category Date Time Status


 


 Up With Assistance AS TOLERATED Activity  05/04/23 04:32 Completed


 


 Call Admit Doctor for Orders ON ADMISSION Care  05/04/23 04:32 Completed


 


 Code Status Order ROUTINE Care  05/04/23 04:32 Completed


 


 EKG-ER Only STAT Care  05/03/23 23:45 Completed


 


 IV Care Q6H Care  05/04/23 04:32 Completed


 


 Place in Observation ROUTINE Care  05/04/23 04:32 Completed


 


 Pulse Oximetry (ED) STAT Care  05/03/23 23:45 Completed


 


 Seizure Precautions -SCCHED STAT Care  05/03/23 23:45 Completed


 


 House Regular Diet Diet  05/04/23 Breakfast Completed


 


 Discharge Routine Discharge  05/04/23 12:04 Ordered


 


 HEAD WITHOUT CONTRAST [CT] Stat Exams  05/03/23 23:46 Completed


 


 Acetaminophen 325 mg*** [Tylenol 325 mg***] Med  05/04/23 04:32 Discontinued





 650 mg PO Q4H PRN PRN   


 


 Escitalopram Oxalate** [Lexapro**] Med  05/04/23 11:30 Discontinued





 5 mg PO QAM   


 


 KETOROLAC trometh 30 mg Inj*** [TORAdol 30 mg Injection Med  05/04/23 04:32 

Discontinued





 ***]   





 30 mg IV Q6H PRN PRN   


 


 Lamotrigine 100 mg*** [lamICTAL 100MG TABLET***] Med  05/04/23 11:30 

Discontinued





 150 mg PO QAM   


 


 Lamotrigine 100 mg*** [lamICTAL 100MG TABLET***] Med  05/04/23 22:00 

Discontinued





 300 mg PO QHS   


 


 Levetiracetam 250 MG*** [Keppra 250 MG***] Med  05/04/23 11:30 Discontinued





 250 mg PO BID   


 


 Levetiracetam [Keppra] Med  05/04/23 11:30 Discontinued





 500 mg PO BID   


 


 Lorazepam 1 mg*** [Ativan 1 MG***] Med  05/04/23 15:00 Discontinued





 1 mg PO TID   


 


 Medication Intervention Med  05/04/23 11:30 Discontinued





 1 each MC .RN TO CHECK   


 


 Topiramate Med  05/04/23 11:30 Discontinued





 100 mg PO QAM   


 


 Topiramate Med  05/04/23 22:00 Discontinued





 200 mg PO QHS   


 


 Trazodone HCl 50 mg*** [Desyrel 50 mg***] Med  05/04/23 22:00 Discontinued





 100 mg PO QHS   


 


 OT Screen per Nursing Assess ONCE OT  05/04/23 05:27 Completed


 


 PT Screen per Nursing Assess ONCE PT  05/04/23 05:27 Completed


 


 Transfer Order Routine Transfer  05/04/23 Completed


 


 Transfer Order Routine Transfer  05/04/23 Completed








                       Patient Care Notes (Last 24 hours)





05/04/23 15:44 Nursing Note by Carline Jay


PT and pt brother Ethan/ caregiver requested this nurse fax paperwork to pt [facility] 

nurse. This nurse contacted [facility] nurse Yuko Pérez at [phone] and obtained fax 

number to send documents. Explanation of care and results were disscussed with 

Yuko JURADO, with patient and patient caregiver permission. Yuko JURADO will try to arrange 

transportation to neurology appointment on May 10 at 1200, due to pt family 

stating they most likely could not keep this appointment. 





Initialized on 05/04/23 15:44 - END OF NOTE

















- Vitals & Intake/Output


Vital Signs: 


                                   Vital Signs











Temperature  97.7 F   05/04/23 11:32


 


Pulse Rate  72   05/04/23 11:32


 


Respiratory Rate  17   05/04/23 11:32


 


Blood Pressure  105/57   05/04/23 11:32


 


O2 Sat by Pulse Oximetry  95   05/04/23 11:32











Intake & Output: 


                                 Intake & Output











 05/02/23 05/03/23 05/04/23 05/05/23





 11:59 11:59 11:59 11:59


 


Intake Total   60 120


 


Balance   60 120


 


Weight   88.6 kg 














- Lab


Result Diagrams: 


                                 05/03/23 00:09





                                 05/03/23 00:09


Lab Results-Last 24 Hrs: 


                            Lab Results-Last 24 Hours











  05/03/23 05/03/23 Range/Units





  00:09 00:09 


 


WBC  6.2   (4.0-10.5)  x10^3/uL


 


RBC  4.14   (4.1-5.6)  x10^6/uL


 


Hgb  12.6   (12.5-18.0)  g/dL


 


Hct  38.8 L   (42-50)  %


 


MCV  93.7   ()  fL


 


MCH  30.4   (26-32)  pg


 


MCHC  32.5   (32-36)  g/dL


 


RDW  13.6   (11.5-14.0)  %


 


Plt Count  195   (150-450)  x10^3/uL


 


MPV  10.8   (7.5-11.0)  fL


 


Gran %  68.7 H   (36.0-66.0)  %


 


Immature Gran % (Auto)  0.3   (0.00-0.4)  %


 


Nucleat RBC Rel Count  0.0   (0.00-0.1)  %


 


Eos # (Auto)  0.16   (0-0.5)  x10^3/uL


 


Immature Gran # (Auto)  0.02   (0.00-0.03)  x10^3u/L


 


Absolute Lymphs (auto)  1.19   (1.0-4.6)  x10^3/uL


 


Absolute Monos (auto)  0.51   (0.0-1.3)  x10^3/uL


 


Absolute Nucleated RBC  0.00   (0.00-0.01)  x10^3u/L


 


Lymphocytes %  19.3 L   (24.0-44.0)  %


 


Monocytes %  8.3   (0.0-12.0)  %


 


Eosinophils %  2.6   (0.00-5.0)  %


 


Basophils %  0.8   (0.0-0.4)  %


 


Absolute Granulocytes  4.25   (1.4-6.9)  x10^3/uL


 


Basophils #  0.05   (0-0.4)  x10^3/uL


 


Sodium   142  (137-145)  mmol/L


 


Potassium   4.1  (3.5-5.1)  mmol/L


 


Chloride   107  ()  mmol/L


 


Carbon Dioxide   23  (22-30)  mmol/L


 


Anion Gap   15.7 H  (5-15)  MEQ/L


 


BUN   15  (9-20)  mg/dL


 


Creatinine   1.45 H  (0.66-1.25)  mg/dL


 


Estimated GFR   > 60.0  ML/MIN


 


Glucose   97  ()  mg/dL


 


Calcium   8.4  (8.4-10.2)  mg/dL


 


Total Bilirubin   0.30  (0.2-1.3)  mg/dL


 


AST   26  (17-59)  U/L


 


ALT   32  (0-50)  U/L


 


Alkaline Phosphatase   102  ()  U/L


 


Serum Total Protein   7.4  (6.3-8.2)  g/dL


 


Albumin   4.0  (3.5-5.0)  g/dL














- Radiology Exams


Ordered Rad Exams-Entire Visit: 


                              Radiology Procedures











 Category Date Time Status


 


 HEAD WITHOUT CONTRAST [CT] Stat Exams  05/03/23 23:46 Completed














- Procedures and Test


Procedures and Tests throughout Hospitalization: 


                            Therapy Orders & Screens





05/04/23 05:27


OT Screen per Nursing Assess ONCE 


   Comment: Protocol Order


   Physician Instructions: Greater than 3 points order OT Admission Screening


   Reason For Exam: Triggered on Admission


   Diagnosis: Seizures


   Open Wound/Cellutlitis/Pressure Ulcers: Yes


   Acute Fx/ORIF/Change in wt bearing status: No


   Severe MUSCULOSKELETAL pain: No


   ADL Dysfunction: No


   Acute CVA w/Hemiparesis/Hemiplegia: No


   Decreased Functional Mobility/Strength: No


   Sprain/Strain: No


   Acute Post-op Mobility Dysfunction: No


   Total Points: 5


PT Screen per Nursing Assess ONCE 


   Comment: Protocol Order


   Physician Instructions: Greater than 3 points order PT Admission Screenin


   Reason For Exam: Triggered on Admission


   Diagnosis: Seizures


   Open Wound/Cellutlitis/Pressure Ulcers: Yes


   Acute Fx/ORIF/Change in wt bearing status: No


   Severe MUSCULOSKELETAL pain: No


   ADL Dysfunction: No


   Acute CVA w/Hemiparesis/Hemiplegia: No


   Decreased Functional Mobility/Strength: No


   Sprain/Strain: No


   Acute Post-op Mobility Dysfunction: No


   Total Points: 5














- Discharge


Discharge Date: 05/04/23


Disposition: Home, Self-Care


Condition: Stable


Prescriptions: 


Continue


   Topiramate 100 mg*** [Topamax 100 MG***] 100 mg PO QAM


   Escitalopram Oxalate 5 mg PO QAM


   lamoTRIgine [Lamotrigine] 150 mg PO QAM


   LORazepam [Lorazepam] 1 mg PO TID


   Trazodone HCl 100 mg PO QHS


   Clobazam [Onfi] 40 mg PO QHS


   Lamotrigine 100 mg*** [lamICTAL 100MG TABLET***] 300 mg PO QHS


   Clobazam [Onfi] 20 mg PO QAM


   Topiramate 100 mg*** [Topamax 100 MG***] 200 mg PO QHS


   Levetiracetam [Keppra] 750 mg PO BID


Instructions:  Epilepsy in Adults


Additional Instructions: 


Do not wash your hair for three days.








You have a follow up appointment scheduled with Dr. Grady on May 10th at 

12:00.  If you need to contact him you may call [phone].  He is located at 

[address] Office [facility], [place]


Follow up with: 


REN RIGGS MD [Primary Care Provider] - 


Forms:  Discharge Instructions

## 2023-05-04 NOTE — XRAY
CLINICAL HISTORY:Multiple seizures today;

COMPARISON:None;

TECHNIQUES:Multiple, contiguous, non-enhanced CT scan of the brain in the

axial plane with multiplanar reconstructions in bony and soft tissue windows;

FINDINGS:

Normal CT attenuation of both cerebral hemispheres with no areas of abnormal

attenuation values.

No suspicious space-occupying lesions.

No intra or extra-axial collections of fresh blood density.

Normal size and shape of the ventricles, basal cisterns, and cortical sulci.

The basal ganglia, thalamus, and internal capsule appear normal.

Brainstem and manan appear normal.

No shift of midline structures.

Unremarkable posterior fossa.

Largely preserved cranial calvarial bones.

Visualized paranasal sinuses appear clear.

IMPRESSION:

Unremarkable study of CT brain.

No acute intracranial abnormality.



_________________________________________



Electronically Signed by: Merle Dickinson MD. (05/04/2023 02:14:34 CST)

## 2023-05-14 ENCOUNTER — HOSPITAL ENCOUNTER (EMERGENCY)
Dept: HOSPITAL 33 - ED | Age: 28
Discharge: ANOTHER HOSPITAL | End: 2023-05-14
Payer: MEDICAID

## 2023-05-14 VITALS — SYSTOLIC BLOOD PRESSURE: 110 MMHG | DIASTOLIC BLOOD PRESSURE: 66 MMHG

## 2023-05-14 VITALS — OXYGEN SATURATION: 100 %

## 2023-05-14 VITALS — HEART RATE: 78 BPM

## 2023-05-14 DIAGNOSIS — Z79.899: ICD-10-CM

## 2023-05-14 DIAGNOSIS — I62.9: ICD-10-CM

## 2023-05-14 DIAGNOSIS — R56.9: Primary | ICD-10-CM

## 2023-05-14 DIAGNOSIS — Z20.828: ICD-10-CM
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
## 2023-05-14 DIAGNOSIS — S01.01XA: ICD-10-CM

## 2023-05-14 LAB
ALBUMIN SERPL-MCNC: 4.3 G/DL (ref 3.5–5)
ALP SERPL-CCNC: 114 U/L (ref 38–126)
ALT SERPL-CCNC: 32 U/L (ref 0–50)
ANION GAP SERPL CALC-SCNC: 17.5 MEQ/L (ref 5–15)
AST SERPL QL: 27 U/L (ref 17–59)
BILIRUB BLD-MCNC: 0.4 MG/DL (ref 0.2–1.3)
BUN SERPL-MCNC: 14 MG/DL (ref 9–20)
CALCIUM SPEC-MCNC: 8.8 MG/DL (ref 8.4–10.2)
CHLORIDE SERPL-SCNC: 112 MMOL/L (ref 98–107)
CO2 SERPL-SCNC: 17 MMOL/L (ref 22–30)
CREAT SERPL-MCNC: 1.05 MG/DL (ref 0.66–1.25)
GFR SERPLBLD BASED ON 1.73 SQ M-ARVRAT: > 60 ML/MIN
GLUCOSE SERPL-MCNC: 122 MG/DL (ref 74–106)
HCT VFR BLD AUTO: 39.7 % (ref 42–50)
HGB BLD-MCNC: 13.6 G/DL (ref 12.5–18)
MCH RBC QN AUTO: 31.7 PG (ref 26–32)
MCHC RBC AUTO-ENTMCNC: 34.3 G/DL (ref 32–36)
PLATELET # BLD AUTO: 197 X10^3/UL (ref 150–450)
POTASSIUM SERPLBLD-SCNC: 3.7 MMOL/L (ref 3.5–5.1)
PROT SERPL-MCNC: 8.2 G/DL (ref 6.3–8.2)
RBC # BLD AUTO: 4.29 X10^6/UL (ref 4.1–5.6)
SODIUM SERPL-SCNC: 142 MMOL/L (ref 137–145)
WBC # BLD AUTO: 8.2 X10^3/UL (ref 4–10.5)

## 2023-05-14 PROCEDURE — 80053 COMPREHEN METABOLIC PANEL: CPT

## 2023-05-14 PROCEDURE — 96374 THER/PROPH/DIAG INJ IV PUSH: CPT

## 2023-05-14 PROCEDURE — 36415 COLL VENOUS BLD VENIPUNCTURE: CPT

## 2023-05-14 PROCEDURE — 99284 EMERGENCY DEPT VISIT MOD MDM: CPT

## 2023-05-14 PROCEDURE — 85027 COMPLETE CBC AUTOMATED: CPT

## 2023-05-14 PROCEDURE — 96376 TX/PRO/DX INJ SAME DRUG ADON: CPT

## 2023-05-14 PROCEDURE — 96375 TX/PRO/DX INJ NEW DRUG ADDON: CPT

## 2023-05-14 PROCEDURE — 70450 CT HEAD/BRAIN W/O DYE: CPT

## 2023-05-14 PROCEDURE — 72125 CT NECK SPINE W/O DYE: CPT

## 2023-05-14 PROCEDURE — 96360 HYDRATION IV INFUSION INIT: CPT

## 2023-05-14 PROCEDURE — 12001 RPR S/N/AX/GEN/TRNK 2.5CM/<: CPT

## 2023-05-14 NOTE — XRAY
CLINICAL HISTORY:fall;

COMPARISON:05/03/2023.;

TECHNIQUES:Multiplanar non-contrast CT head performed without contrast. CTDI:

71 mGy, DLP: 1447 mGy*cm;

FINDINGS:

Right parietal subgaleal scalp hematoma measuring 1.1 x 5.5 cm was noted. No

underlying bony injury detected.

4 mm focal hyperdensity left posterior temporal lobe is most likely volume

averaging or a small focal hemorrhagic contusion, advise clinical correlation

and follow up.

The rest of visualized brain parenchyma shows normal appearance.

Gray-white matter differentiation is maintained.

No midline shifts or deformity.

No intracerebral or extra axial hematoma.

Normal size and configuration of the cerebral ventricles.

Normal CT appearance of the posterior fossa structures namely the cerebellar

hemispheres, brainstem and cerebellar peduncles.

The IACs are unremarkable.

The cerebello-pontine angles are clear.

The osseous structures in the skull base are unremarkable.

No definite calvarium fractures.

Th2 scanned paranasal sinuses are clear.

Bowing of nasal septum to right.

IMPRESSION:

1. Right parietal subgaleal scalp hematoma measuring 1.1 x 5.5 cm was noted.

No underlying bony injury detected.

2. 4 mm focal hyperdensity left posterior temporal lobe is most likely volume

averaging or a small focal hemorrhagic contusion, advise clinical correlation

and follow-up.

The [facility] office was called at [phone] at

02:53 CST, 5/14/2023 and the Significant medical findings are verbally

communicated with Moose Armenta.



_________________________________________



Electronically Signed by: Merle Dickinson MD. (05/14/2023 14:58:33 CST)

## 2023-05-14 NOTE — XRAY
CLINICAL HISTORY:fall;

COMPARISON:None;

TECHNIQUES:Multiplanar non-contrast CT cervical spine performed. CTDI: 71 mGy,

DLP: 1447 mGy*cm;

FINDINGS:

Alignment and osseous structures: Loss of lordosis indicates muscle spasm.

The vertebral bodies are normal in height. No lytic or sclerotic bone lesion.

The craniovertebral measures are unremarkable.

Intervertebral disc spaces.

Normal disc height is noted.

Level by level analysis.

C2-C3: No central canal or neuroforaminal stenosis.

C3-C4: No central canal or neuroforaminal stenosis.

C4-C5: No central canal or neuroforaminal stenosis.

C5-C6: No central canal or neuroforaminal stenosis.

C6-C7: No central canal or neuroforaminal stenosis.

IMPRESSION:

1. Loss of lordosis indicates muscle spasm.

2. Otherwise unremarkable CT cervical spine with no fracture or dislocation

seen.



_________________________________________



Electronically Signed by: Merle Dickinson MD. (05/14/2023 14:56:28 CST)

## 2023-05-14 NOTE — ERPHSYRPT
- History of Present Illness


Time Seen by Provider: 05/14/23 14:08


Source: patient, EMS


Exam Limitations: no limitations


Patient Subjective Stated Complaint: Pt had a seizure and hit the back of his 

head causing a 1 cm laceration


Triage Nursing Assessment: Pt brought to the ER by EMS, vitals wnl, rates head 

pain as 8/10, nausea, denies vomiting, sleepy (could be postictal), pulses 

normal, skin n/w/d, last seizure a week ago, usually has 1 seizure/week


Physician History: 





27-year-old with history of seizure disorder poorly controlled on multiple 

medications, migraine presented in the ER after he had a seizure activity prior 

to arrival while he was standing and fell backward hitting his head against a 

countertop with a laceration of the back head.  Patient is awake alert and 

oriented on presentation, complaining of some headache all over.  Denies any 

neck pain or difficulty movements of neck.  No focal numbness tingling or 

weakness reported.


Occurred: just prior to arrival


Severity: moderate


Head Injury Location: occipital, parietal


Method of Injury: fell


Loss of Consciousness: seizure


Associated Symptoms: nausea, headaches, seizure, No shortness of breath, No 

chest pain, No loss of appetite, No malaise, No syncope, No weakness


Allergies/Adverse Reactions: 








No Known Drug Allergies Allergy (Verified 05/14/23 14:16)


   





Home Medications: 








Clobazam [Onfi] 20 mg PO QAM 08/30/22 [History]


Clobazam [Onfi] 40 mg PO QHS 08/30/22 [History]


Escitalopram Oxalate 5 mg PO QAM 08/30/22 [History]


LORazepam [Lorazepam] 1 mg PO TID 08/30/22 [History]


Lamotrigine 100 mg*** [lamICTAL 100MG TABLET***] 300 mg PO QHS 08/30/22 

[History]


Topiramate 100 mg*** [Topamax 100 MG***] 100 mg PO QAM 08/30/22 [History]


Topiramate 100 mg*** [Topamax 100 MG***] 200 mg PO QHS 08/30/22 [History]


Trazodone HCl 100 mg PO QHS 08/30/22 [History]


lamoTRIgine [Lamotrigine] 150 mg PO QAM 08/30/22 [History]


Levetiracetam [Keppra] 750 mg PO BID 05/04/23 [History]





Hx Tetanus, Diphtheria Vaccination/Date Given: No


Hx Influenza Vaccination/Date Given: No


Hx Pneumococcal Vaccination/Date Given: No





Travel Risk





- International Travel


Have you traveled outside of the country in past 3 weeks: No





- Coronavirus Screening


Are you exhibiting any of the following symptoms?: No


Close contact with a COVID-19 positive Pt in past 14-21 Days: No





- Vaccine Status


Have you recieved a Covid-19 vaccination: No





- Review of Systems


Constitutional: No Symptoms


Eyes: No Symptoms


Ears, Nose, & Throat: No Symptoms


Respiratory: No Symptoms


Cardiac: No Symptoms


Abdominal/Gastrointestinal: Nausea


Genitourinary Symptoms: No Symptoms


Musculoskeletal: Injury


Skin: Skin Lesions


Neurological: Headache, Seizure


Endocrine: No Symptoms


Hematologic/Lymphatic: No Symptoms


Immunological/Allergic: No Symptoms





- Past Medical History


Pertinent Past Medical History: Yes


Neurological History: Seizures


ENT History: No Pertinent History, Cataracts


Cardiac History: No Pertinent History


Respiratory History: No Pertinent History


Endocrine Medical History: No Pertinent History


Musculoskeletal History: No Pertinent History


GI Medical History: No Pertinent History


 History: No Pertinent History


Psycho-Social History: No Pertinent History


Male Reproductive Disorders: No Pertinent History


Other Medical History: Vagus nerve Stimulator device implanted





- Past Surgical History


Past Surgical History: Yes


Neuro Surgical History: Other


Cardiac: No Pertinent History


Respiratory: No Pertinent History


Gastrointestinal: No Pertinent History


Genitourinary: No Pertinent History


Musculoskeletal: No Pertinent History


Male Surgical History: No Pertinent History


Other Surgical History: vagal nerve stimulator





- Social History


Smoking Status: Never smoker


Exposure to second hand smoke: Yes


Drug Use: none


Patient Lives Alone: Yes


Significant Family History: no pertinent family hx





- Nursing Vital Signs


Nursing Vital Signs: 


                               Initial Vital Signs











Blood Pressure  112/78   05/14/23 14:03








                                   Pain Scale











Pain Intensity                 6

















- Sudha Coma Score


Best Eye Response (Sudha): (4) open spontaneously


Best Verbal Response ([place]): (5) oriented


Best Motor Response ([place]): (6) obeys commands


[place] Total: 15





- Physical Exam


General Appearance: no apparent distress, alert


Head Injury: contusions (Right posterior parietal area adjacent to laceration), 

lacerations (1.25 cm laceration right posterior parietal area with no step in 

deformity.), swelling, tenderness, No Laguerre's Sign, No raccoon eyes


Eye Exam: bilateral eye: normal inspection, PERRL, EOMI


ENT Exam: airway nml, nml ext.inspection, No evidence of ENT injury, No dental 

injury


Neck Exam: supple, trachea midline, full range of motion, normal alignment, 

normal inspection


Cardiovascular/Respiratory Exam: chest non-tender, normal breath sounds, regular

 rate/rhythm


Gastrointestinal/Abdominal Exam: soft, non tender, no distention, no mass, no 

guarding, no ecchymosis


Back Exam: normal inspection, normal range of motion


Extremity Exam: non-tender, normal range of motion, normal inspection, calf 

tenderness


Mental Status Exam: alert, oriented x 3, cooperative


CNs Exam: normal hearing, normal speech, PERRL


Coordination/Gait Exam: normal finger to nose


Motor/Sensory Exam: no motor deficit, no sensory deficit


DTR Exam: bicep (R): 2+, bicep (L): 2+, knee (R): 2+, knee (L): 2+


Skin Exam: normal color


**SpO2 Interpretation**: normal


SpO2: 100


O2 Delivery: Room Air





Procedures





- Laceration/Wound Repair


  ** Right Parietal


Time of Procedure: 17:00


Wound Location: Right


Wound Length (cm): 1.25


Wound's Depth, Shape: into muscle, irregular


Wound Explored: clean


Irrigated: Yes


Hibiclens Prep: Yes


Anesthesia: 1% Lidocaine


Volume Anesthetic (ccs): 3


Wound Repaired With: Staples


Number of Sutures: 3


Layer Closure?: No


Sterile Dressing Applied?: No


Progress: 





05/14/23 17:10


Patient tolerated procedure very well


Ordered Tests: 


                               Active Orders 24 hr











 Category Date Time Status


 


 CERVICAL SPINE WO CONTRAST [CT] Stat Exams  05/14/23 14:21 Completed


 


 HEAD WITHOUT CONTRAST [CT] Stat Exams  05/14/23 14:22 Completed


 


 CBC Stat Lab  05/14/23 14:39 Completed


 


 CMP Stat Lab  05/14/23 14:39 Completed








Medication Summary














Discontinued Medications














Generic Name Dose Route Start Last Admin





  Trade Name David  PRN Reason Stop Dose Admin


 


Acetaminophen  1,000 mg  05/14/23 14:30  05/14/23 14:53





  Acetaminophen 500 Mg Tablet  PO  05/14/23 14:31  1,000 mg





  STAT ONE   Administration


 


Acetaminophen  Confirm  05/14/23 14:29 





  Acetaminophen 500 Mg Tablet  Administered  05/14/23 14:30 





  Dose  





  1,000 mg  





  .ROUTE  





  .STK-MED ONE  


 


Sodium Chloride  1,000 mls @ 999 mls/hr  05/14/23 14:23  05/14/23 14:28





  Sodium Chloride 0.9% 1000 Ml  IV  05/14/23 15:23  999 mls/hr





  .Q1H1M STA   Administration


 


Sodium Chloride  Confirm  05/14/23 14:26 





  Sodium Chloride 0.9% 1000 Ml  Administered  05/14/23 14:27 





  Dose  





  1,000 mls @ ud  





  .ROUTE  





  .STK-MED ONE  


 


Lorazepam  Confirm  05/14/23 17:37 





  Lorazepam 2 Mg/1 Ml*** 2 Mg Vial  Administered  05/14/23 17:38 





  Dose  





  2 mg  





  .ROUTE  





  .STK-MED ONE  


 


Morphine Sulfate  4 mg  05/14/23 16:58  05/14/23 17:02





  Morphine Sulfate 4 Mg/Ml Injection  IV  05/14/23 16:59  4 mg





  STAT ONE   Administration


 


Morphine Sulfate  Confirm  05/14/23 17:01 





  Morphine Sulfate 4 Mg/Ml Injection  Administered  05/14/23 17:02 





  Dose  





  4 mg  





  .ROUTE  





  .STK-MED ONE  


 


Ondansetron HCl  Confirm  05/14/23 15:19 





  Ondansetron Hcl 4 Mg/2 Ml Vial  Administered  05/14/23 15:20 





  Dose  





  4 mg  





  .ROUTE  





  .STK-MED ONE  


 


Ondansetron HCl  4 mg  05/14/23 15:19  05/14/23 15:22





  Ondansetron Hcl 4 Mg/2 Ml Vial  IV  05/14/23 15:20  4 mg





  STAT ONE   Administration


 


Ondansetron HCl  4 mg  05/14/23 15:42  05/14/23 15:44





  Ondansetron Hcl 4 Mg/2 Ml Vial  IV  05/14/23 15:43  4 mg





  STAT ONE   Administration


 


Ondansetron HCl  Confirm  05/14/23 15:42 





  Ondansetron Hcl 4 Mg/2 Ml Vial  Administered  05/14/23 15:43 





  Dose  





  4 mg  





  .ROUTE  





  .STK-MED ONE  











Lab/Rad Data: 


                           Laboratory Result Diagrams





                                 05/14/23 14:39 





                                 05/14/23 14:39 





                               Laboratory Results











  05/14/23 05/14/23 Range/Units





  14:39 14:39 


 


WBC   8.2  (4.0-10.5)  x10^3/uL


 


RBC   4.29  (4.1-5.6)  x10^6/uL


 


Hgb   13.6  (12.5-18.0)  g/dL


 


Hct   39.7 L  (42-50)  %


 


MCV   92.5  ()  fL


 


MCH   31.7  (26-32)  pg


 


MCHC   34.3  (32-36)  g/dL


 


RDW   13.7  (11.5-14.0)  %


 


Plt Count   197  (150-450)  x10^3/uL


 


MPV   10.7  (7.5-11.0)  fL


 


Sodium  142   (137-145)  mmol/L


 


Potassium  3.7   (3.5-5.1)  mmol/L


 


Chloride  112 H   ()  mmol/L


 


Carbon Dioxide  17 L   (22-30)  mmol/L


 


Anion Gap  17.5 H   (5-15)  MEQ/L


 


BUN  14   (9-20)  mg/dL


 


Creatinine  1.05   (0.66-1.25)  mg/dL


 


Estimated GFR  > 60.0   ML/MIN


 


Glucose  122 H   ()  mg/dL


 


Calcium  8.8   (8.4-10.2)  mg/dL


 


Total Bilirubin  0.40   (0.2-1.3)  mg/dL


 


AST  27   (17-59)  U/L


 


ALT  32   (0-50)  U/L


 


Alkaline Phosphatase  114   ()  U/L


 


Serum Total Protein  8.2   (6.3-8.2)  g/dL


 


Albumin  4.3   (3.5-5.0)  g/dL














- Progress


Progress: improved, pain not gone completely, re-examined


Progress Note: 





05/14/23 17:11


27-year-old with history of poorly controlled seizures, migraine is evaluated in

 the ER after he had a seizure activity at home and fell backward with a 

laceration posterior parietal area and some confusion.  Patient has nonfocal 

neuro exam throughout her stay in the ER.  Baseline labs fairly unremarkable, 

given fluids and symptomatic treatment.  CT head showed scalp hematoma and left 

parietal lobe hemorrhagic contusion.  Called [facility], no neurosurg

sumeet services are available in [place].  Called [facility] and patient is 

excepted by Dr. Allen at [facility].  I have a thorough discussion with 

patient/mom and brother who is the POA about current work-up, management and 

plan of transfer and they all understand and agree with it.


05/14/23 17:43


Right before transfer patient has a couple of seizure episodes each lasting less

 than 1 minute, given Ativan which helped.  Ready to leave with a stable vitals.


Counseled pt/family regarding: lab results, diagnosis, need for follow-up, rad 

results





Medical Desision Making





- Independent Historian


Additional History obtained from: Mother, Family, EMS





- Discussion of managment


Care discussed with:: on-call "doc" (Dr. Alejandro [facility])


Reviewed:: Test results


Agreed on:: Treatment plan


Will see patient: in ED





- Diagnostic Testing


Diagnostic test were ordered, analyzed, and reviewed by me: Yes


Radiological Interpretation: Reviewed by me, Teleradiologist Report





- Risk of complications


The pt has a high risk of morbidity or mortality based on: Decision regarding 

hospitilization or escalation of hosp level of care, Need for parental consent





- Departure


Departure Disposition: Transfer


Clinical Impression: 


 Laceration of scalp, Seizures, Intracranial hemorrhage





Condition: Stable


Critical Care Time: No


Referrals: 


REN RIGGS MD [Primary Care Provider] - Follow up/PCP as directed